# Patient Record
Sex: MALE | Race: WHITE
[De-identification: names, ages, dates, MRNs, and addresses within clinical notes are randomized per-mention and may not be internally consistent; named-entity substitution may affect disease eponyms.]

---

## 2017-01-03 ENCOUNTER — HOSPITAL ENCOUNTER (EMERGENCY)
Dept: HOSPITAL 80 - FED | Age: 82
Discharge: HOME | End: 2017-01-03
Payer: COMMERCIAL

## 2017-01-03 VITALS — RESPIRATION RATE: 16 BRPM | OXYGEN SATURATION: 97 %

## 2017-01-03 VITALS — TEMPERATURE: 98.4 F | SYSTOLIC BLOOD PRESSURE: 126 MMHG | HEART RATE: 68 BPM | DIASTOLIC BLOOD PRESSURE: 74 MMHG

## 2017-01-03 DIAGNOSIS — I10: ICD-10-CM

## 2017-01-03 DIAGNOSIS — E11.65: ICD-10-CM

## 2017-01-03 DIAGNOSIS — S42.402A: Primary | ICD-10-CM
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
## 2017-01-03 DIAGNOSIS — Z79.82: ICD-10-CM

## 2017-01-03 DIAGNOSIS — Z91.040: ICD-10-CM
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
## 2017-01-03 DIAGNOSIS — I50.9: ICD-10-CM

## 2017-01-03 LAB
% IMMATURE GRANULYOCYTES: 0.2 % (ref 0–1.1)
ABSOLUTE IMMATURE GRANULOCYTES: 0.02 10^3/UL (ref 0–0.1)
ABSOLUTE NRBC COUNT: 0 10^3/UL (ref 0–0.01)
ADD DIFF?: NO
ADD MORPH?: NO
ADD SCAN?: NO
ANION GAP SERPL CALC-SCNC: 11 MEQ/L (ref 8–16)
APTT BLD: 29.8 SEC (ref 23–38)
ATYPICAL LYMPHOCYTE FLAG: 0 (ref 0–99)
CALCIUM SERPL-MCNC: 9.3 MG/DL (ref 8.5–10.4)
CHLORIDE SERPL-SCNC: 102 MEQ/L (ref 97–110)
CO2 SERPL-SCNC: 23 MEQ/L (ref 22–31)
CREAT SERPL-MCNC: 1.1 MG/DL (ref 0.7–1.3)
ERYTHROCYTE [DISTWIDTH] IN BLOOD BY AUTOMATED COUNT: 13.6 % (ref 11.5–15.2)
FRAGMENT RBC FLAG: 0 (ref 0–99)
GFR SERPL CREATININE-BSD FRML MDRD: > 60 ML/MIN/{1.73_M2}
GLUCOSE SERPL-MCNC: 203 MG/DL (ref 70–100)
HCT VFR BLD CALC: 41.2 % (ref 40–51)
HGB BLD-MCNC: 14.5 G/DL (ref 13.7–17.5)
INR PPP: 1.06 (ref 0.83–1.16)
LEFT SHIFT FLG: 0 (ref 0–99)
LIPEMIA HEMOLYSIS FLAG: 90 (ref 0–99)
MCH RBC BLDCO QN: 32.8 PG (ref 27.9–34.1)
MCHC RBC AUTO-ENTMCNC: 35.2 G/DL (ref 32.4–36.7)
MCV RBC AUTO: 93.2 FL (ref 81.5–99.8)
NRBC-AUTO%: 0 % (ref 0–0.2)
PLATELET # BLD: 159 10^3/UL (ref 150–400)
PLATELET CLUMPS FLAG: 0 (ref 0–99)
PMV BLD AUTO: 9.5 FL (ref 8.7–11.7)
POTASSIUM SERPL-SCNC: 4 MEQ/L (ref 3.5–5.2)
PROTHROMBIN TIME: 13.7 SEC (ref 12–15)
RBC # BLD AUTO: 4.42 10^6/UL (ref 4.4–6.38)
SODIUM SERPL-SCNC: 136 MEQ/L (ref 134–144)
TROPONIN I SERPL-MCNC: 0.01 NG/ML (ref 0–0.03)
URATE SERPL-MCNC: 7 MG/DL (ref 3.5–8.5)

## 2017-01-03 NOTE — EDPHY
H & P


Stated Complaint: L elbow hurts since yesterday;increases w/movement;states 

slightly swollen


Time Seen by Provider: 01/03/17 07:19


HPI/ROS: 


CHIEF COMPLAINT:  Left elbow pain and swelling


 


HISTORY OF PRESENT ILLNESS:  Patient is an 85-year-old man with history of 

hypertension and type 2 diabetes who comes to the emergency department 

complaining of left elbow pain and swelling for the last 2 days.  He states 

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  He denies 

any trauma or injury. He does have a history of arthritis as well no gout.  He 

denies chest pain or shortness of breath.  It hurts with range of motion. No 

shoulder or wrist pain. No erythema or warmth.  He has not been febrile.








REVIEW OF SYSTEMS:


Constitutional:  denies: chills, fever, recent illness, recent injury


EENTM: denies: blurred vision, double vision, nose congestion


Respiratory: denies: cough, shortness of breath


Cardiac: denies: chest pain, irregular heart rate, lightheadedness, palpitations


Gastrointestinal/Abdominal: denies: abdominal pain, diarrhea, nausea, vomiting, 

blood streaked stools


Genitourinary: denies: dysuria, frequency, hematuria, pain


Musculoskeletal:  See HPI


Skin: denies: lesions, rash, jaundice, bruising


Neurological: denies: headache, numbness, paresthesia, tingling, dizziness, 

weakness


Hematologic/Lymphatic: denies: blood clots, easy bleeding, easy bruising


Immunologic/allergic: denies: HIV/AIDS, transplant








EXAM:


GENERAL:  Well-appearing, well-nourished and in no acute distress.


HEAD:  Atraumatic, normocephalic.


EYES:  Pupils equal round and reactive to light, extraocular movements intact, 

sclera anicteric, conjunctiva are normal.


ENT:  TMs normal, nares patent, oropharynx clear without exudates.  Moist 

mucous membranes.


NECK:  Normal range of motion, supple without lymphadenopathy or JVD.


LUNGS:  Breath sounds clear to auscultation bilaterally and equal.  No wheezes 

rales or rhonchi.


HEART:  Regular rate and rhythm without murmurs, rubs or gallops.


ABDOMEN:  Soft, nontender, normoactive bowel sounds.  No guarding, no rebound.  

No masses appreciated.


BACK:  No CVA tenderness, no spinal tenderness, step-offs or deformities


EXTREMITIES:  Left elbow with mild swelling, pain with range of motion. No 

obvious effusion felt.  No warmth to the touch.  No erythema.


NEUROLOGICAL:  Cranial nerves II through XII grossly intact.  Normal speech, 

normal gait.  5/5 strength, normal movement in all extremities, normal sensation


PSYCH:  Normal mood, normal affect.


SKIN:  Warm, dry, normal turgor, no visible rashes or lesions.








Source: Patient, Family


Exam Limitations: No limitations





- Personal History


Current Tetanus Diphtheria and Acellular Pertussis (TDAP): Yes





- Medical/Surgical History


Hx Asthma: No


Hx Chronic Respiratory Disease: No


Hx Diabetes: Yes


Hx Cardiac Disease: Yes


Hx Renal Disease: No


Hx Cirrhosis: No


Hx Alcoholism: No


Hx HIV/AIDS: No


Hx Splenectomy or Spleen Trauma: No


Other PMH: Pacer, CHF, A-fib, HTN, hyperlipidemia, hypothyroidism;diabetes;

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

DIVERTICULOSIS





- Family History


Significant Family History: Hypertension





- Social History


Smoking Status: Never smoked


Alcohol Use: Sober


Drug Use: None


Constitutional: 


 Initial Vital Signs











Temperature (C)  37 C   01/03/17 07:10


 


Heart Rate  67   01/03/17 07:10


 


Respiratory Rate  16   01/03/17 07:10


 


Blood Pressure  130/75 H  01/03/17 07:10


 


O2 Sat (%)  97   01/03/17 07:10








 











O2 Delivery Mode               Room Air














Allergies/Adverse Reactions: 


 





latex Allergy (Mild, Verified 01/03/17 07:10)


 itch








Home Medications: 














 Medication  Instructions  Recorded


 


Aspirin [Aspirin 81mg (*)] 81 mg PO DAILY 01/03/17


 


Atorvastatin Calcium [Lipitor 10 10 mg PO DAILY 01/03/17





mg (*)]  


 


Carvedilol [Coreg (*)] 12.5 mg PO BIDMEAL 01/03/17


 


Furosemide [Lasix] 40 mg PO 01/03/17


 


Herbals/Supplements -Info Only 1 ea PO 01/03/17


 


Hydrocodone/APAP 5/325 [[place] 1 - 2 each PO Q4 PRN #14 tab 01/03/17





5/325]  


 


Levothyroxine [Synthroid 75 mcg 75 mcg PO DAILY06 01/03/17





(*)]  


 


Lisinopril [Zestril 10 mg (*)] 10 mg PO 01/03/17


 


metFORMIN HCL [Glucophage 500 mg 500 mg PO 01/03/17





(*)]  














Medical Decision Making





- Diagnostics


EKG Interpretation: 


An EKG obtained and was read and documented in trace view.  Please see trace 

view for full reading and report.  Ventricular paced rhythm, no ischemic 

changes 


Imaging: 


X-ray:  Left arm x-ray was obtained.  I viewed the images myself on the PACS 

system.  My interpretation of the images is:  Joint effusion.  The radiologist 

interpretation is joint effusion and likely radial head fracture.


Procedures: 


Procedure:  Splint placement.





A left elbow short arm splint was applied.  After application of the splint I 

returned and re-examined the patient.  The splint was adequately immobilizing 

the joint and distal to the splint the patient's circulation and sensation was 

intact.


ED Course/Re-evaluation: 


Patient has what appears to be a radial head fracture on x-ray.  He denies any 

recent falls or injuries but his wife states that he typically will not answer 

questions about hurting himself and will not give me any information.  Wife 

states that he did break it in the same area last year and that he has been 

lifting junior of hay recently.  His elbow does not appear infected.  His lab 

work is reassuring as is his EKG.  I will place him in a splint and have him 

follow up with Orthopedics.  I discussed with him and his wife indications for 

returning not limited to but especially pertaining to fevers or signs of 

infections. Additional verbal discharge instructions given.  We also discussed 

his elevated glucose and need for tight control.


Differential Diagnosis: 


Partial list of the Differential diagnosis considered include but were not 

limited to;  fracture, joint effusion, gout, arthritis, and although unlikely 

based on the history and physical exam, I also considered dislocation, vascular 

injury, doubt septic joint.  I discussed these differential diagnoses and the 

plan with the patient as well as the usual and expected course.  The patient 

understands that the diagnosis is provisional and that in medicine we are not 

always correct and that further workup is often warranted.  Usual and customary 

warnings were given.  All of the patient's questions were answered.  The 

patient was instructed to return to the emergency department should the 

symptoms at all worsen or return, otherwise to followup with the physician as 

we discussed.





- Data Points


Laboratory Results: 


 Laboratory Results





 01/03/17 07:30 





 01/03/17 07:30 











Departure





- Departure


Disposition: Home, Routine, Self-Care


Clinical Impression: 


Elbow fracture, left


Qualifiers:


 Encounter type: initial encounter Fracture type: closed Qualifier Code: (

S42.402A) Unspecified fracture of lower end of left humerus, initial encounter 

for closed fracture





Hyperglycemia due to type 2 diabetes mellitus


Qualifiers:


 Diabetes mellitus long term insulin use: without long term use Qualifier Code: 

(E11.65) Type 2 diabetes mellitus with hyperglycemia


Condition: Fair


Instructions:  Elbow Fracture in Adults (ED), Diabetic Hyperglycemia (ED)


Referrals: 


Marv Mckinney MD [Primary Care Provider] - As per Instructions


Alexsander Alaniz MD [Medical Doctor] - As per Instructions


Prescriptions: 


Hydrocodone/APAP 5/325 [[place] 5/325] 1 - 2 each PO Q4 PRN #14 tab


 PRN Reason: Pain, Mild

## 2017-01-03 NOTE — DX
Left Elbow, Three Views 

 

Clinical Indication: Elbow pain.

 

Findings: There is a moderate-sized joint effusion. There is a moderate amount of debris in the joint
 space versus calcifications at tendinous insertions. Subtle linear lucency is seen in the radial nec
k on the lateral view.  

 

Impression:   Moderate-sized joint effusion. Suspect occult fracture. Linear lucency of the radial ne
ck may represent a nondisplaced fracture. Moderate amount of degenerative debris is also present and 
calcification of ligamentous attachments.

 

Results discussed by Dr. Sanchez to Dr. Trujillo on January 3, 2017 at 0840 hours.

 

I have also reviewed this with Dr. Guy Mares who agrees with the above findings.

## 2017-01-03 NOTE — CPEKG
Heart Rate: 72

RR Interval: 833

P-R Interval: 231

QRSD Interval: 184

QT Interval: 440

QTC Interval: 482

P Axis: 0

QRS Axis: -69

T Wave Axis: 101

EKG Severity - ABNORMAL ECG -

EKG Impression: VENTRICULAR-PACED RHYTHM

Electronically Signed By: Spike Trujillo 03-Jan-2017 07:47:56

## 2017-08-31 ENCOUNTER — HOSPITAL ENCOUNTER (OUTPATIENT)
Dept: HOSPITAL 80 - BHFA | Age: 82
End: 2017-08-31
Attending: INTERNAL MEDICINE
Payer: COMMERCIAL

## 2017-08-31 DIAGNOSIS — I48.2: Primary | ICD-10-CM

## 2017-08-31 DIAGNOSIS — Z95.0: ICD-10-CM

## 2017-09-19 ENCOUNTER — HOSPITAL ENCOUNTER (OUTPATIENT)
Dept: HOSPITAL 80 - FCATH | Age: 82
Discharge: HOME | End: 2017-09-19
Attending: INTERNAL MEDICINE
Payer: COMMERCIAL

## 2017-09-19 DIAGNOSIS — Z45.010: Primary | ICD-10-CM

## 2017-09-19 DIAGNOSIS — I48.91: ICD-10-CM

## 2017-09-19 LAB
% IMMATURE GRANULYOCYTES: 0.4 % (ref 0–1.1)
ABSOLUTE IMMATURE GRANULOCYTES: 0.03 10^3/UL (ref 0–0.1)
ABSOLUTE NRBC COUNT: 0 10^3/UL (ref 0–0.01)
ADD DIFF?: NO
ADD MORPH?: NO
ADD SCAN?: NO
ANION GAP SERPL CALC-SCNC: 10 MEQ/L (ref 8–16)
ATYPICAL LYMPHOCYTE FLAG: 10 (ref 0–99)
CALCIUM SERPL-MCNC: 9.3 MG/DL (ref 8.5–10.4)
CHLORIDE SERPL-SCNC: 104 MEQ/L (ref 97–110)
CO2 SERPL-SCNC: 25 MEQ/L (ref 22–31)
CREAT SERPL-MCNC: 1.1 MG/DL (ref 0.7–1.3)
ERYTHROCYTE [DISTWIDTH] IN BLOOD BY AUTOMATED COUNT: 13.8 % (ref 11.5–15.2)
FRAGMENT RBC FLAG: 0 (ref 0–99)
GFR SERPL CREATININE-BSD FRML MDRD: > 60 ML/MIN/{1.73_M2}
GLUCOSE SERPL-MCNC: 95 MG/DL (ref 70–100)
HCT VFR BLD CALC: 42.5 % (ref 40–51)
HGB BLD-MCNC: 14.4 G/DL (ref 13.7–17.5)
INR PPP: 1.12 (ref 0.83–1.16)
LEFT SHIFT FLG: 0 (ref 0–99)
LIPEMIA HEMOLYSIS FLAG: 90 (ref 0–99)
MCH RBC BLDCO QN: 32.5 PG (ref 27.9–34.1)
MCHC RBC AUTO-ENTMCNC: 33.9 G/DL (ref 32.4–36.7)
MCV RBC AUTO: 95.9 FL (ref 81.5–99.8)
NRBC-AUTO%: 0 % (ref 0–0.2)
PLATELET # BLD: 207 10^3/UL (ref 150–400)
PLATELET CLUMPS FLAG: 0 (ref 0–99)
PMV BLD AUTO: 9.1 FL (ref 8.7–11.7)
POTASSIUM SERPL-SCNC: 4.4 MEQ/L (ref 3.5–5.2)
PROTHROMBIN TIME: 14.3 SEC (ref 12–15)
RBC # BLD AUTO: 4.43 10^6/UL (ref 4.4–6.38)
SODIUM SERPL-SCNC: 139 MEQ/L (ref 134–144)

## 2017-09-19 PROCEDURE — 0JH635Z INSERTION OF PACEMAKER, SINGLE CHAMBER RATE RESPONSIVE INTO CHEST SUBCUTANEOUS TISSUE AND FASCIA, PERCUTANEOUS APPROACH: ICD-10-PCS | Performed by: INTERNAL MEDICINE

## 2017-09-19 PROCEDURE — C1786 PMKR, SINGLE, RATE-RESP: HCPCS

## 2017-09-19 PROCEDURE — 0JPT3PZ REMOVAL OF CARDIAC RHYTHM RELATED DEVICE FROM TRUNK SUBCUTANEOUS TISSUE AND FASCIA, PERCUTANEOUS APPROACH: ICD-10-PCS | Performed by: INTERNAL MEDICINE

## 2017-09-19 NOTE — CPEKG
Heart Rate: 71

RR Interval: 845

QRSD Interval: 124

QT Interval: 420

QTC Interval: 457

P Axis: 0

QRS Axis: -62

T Wave Axis: 154

EKG Severity - ABNORMAL ECG -

EKG Impression: VENTRICULAR-PACED COMPLEXES

EKG Impression: PVC

Electronically Signed By: Mahesh Singh 19-Sep-2017 11:41:50

## 2017-09-19 NOTE — PDPROPOC
Sedation Plan of Care


Sedation Plan of Care: vital signs stable


ASA Classification: ASA 2


Planned drugs: fentanyl, midazolam


Mallampati Score: Class 2


Mallampati Reference Image: 





Patient passed 3-3-2 rule?: Yes

## 2017-09-19 NOTE — EPPROC
Electrophysiology Procedure Note: 





PROCEDURE PERFORMED:





1.   V Pacemaker generator change





INDICATION:





Pacemaker generator at [place]


Bradycardia


Atrial fibrillation





PROCEDURE NOTE:





Patient presented to the cardiac catheterization laboratory in a fasting, 

postabsorptive state.  CCL RN administered sedation.  The left infraclavicular 

area was prepped and draped in the usual sterile fashion.  Lidocaine plus 

bupivacaine was used for local anesthesia. Using a combination of blunt and 

sharp dissection and electrocautery, the dissection was carried down to the 

prepectoral fascia and the existing pacemaker pocket was opened.  





The pacemaker generator was disconnected from the leads and the lead thresholds 

and impedance were checked.   The pacemaker pocket was copiously irrigated with 

antibiotic solution.  The pocket was again inspected for any bleeding.  The 

leads were attached to the pacemaker securely.  The pacemaker was inserted into 

the pocket and secured in place with a nonabsorbable suture.  





The pacemaker pocket was closed in 3 layers with absorbable monocryl sutures 

and staples. Appropriate dressing was applied.  The patient left the cardiac 

catheterization laboratory in stable condition.








Serial Numbers:


1.   Device      Biotronik Eluna [id number] SN [id number]


2.   Ventricular Lead     Biotronik SX60/[id number] SN [id number]








Stimulation Thresholds & Impedance Measurements:


 


1.   Ventricular Lead    0.6 V 0.4 ms R 9.6 mV 682 ohm


2.   Abandoned atrial lead, capped previously [id number] SN [id number]





Xiang Pacing Parameters





1.   Pacing mode     VVI   CLS      


2.   Lower rate     60 ppm         


3.   Upper tracking rate     130 ppm      


4.   Upper sensor rate     130 ppm   


Patient Problems: 


 Problems











Problem Status Onset


 


Bradycardia Acute  


 


Gastrointestinal bleeding, lower Acute  


 


Blood loss anemia Acute  


 


Tubular adenoma Acute  


 


Atrial fibrillation Acute

## 2018-05-04 ENCOUNTER — HOSPITAL ENCOUNTER (EMERGENCY)
Dept: HOSPITAL 80 - FED | Age: 83
Discharge: HOME | End: 2018-05-04
Payer: COMMERCIAL

## 2018-05-04 VITALS — SYSTOLIC BLOOD PRESSURE: 162 MMHG | DIASTOLIC BLOOD PRESSURE: 90 MMHG

## 2018-05-04 DIAGNOSIS — Z91.040: ICD-10-CM

## 2018-05-04 DIAGNOSIS — E11.9: ICD-10-CM

## 2018-05-04 DIAGNOSIS — S83.005A: Primary | ICD-10-CM

## 2018-05-04 DIAGNOSIS — Z79.84: ICD-10-CM
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
## 2018-05-04 DIAGNOSIS — Z79.82: ICD-10-CM

## 2018-05-04 LAB — PLATELET # BLD: 195 10^3/UL (ref 150–400)

## 2018-05-04 PROCEDURE — 70450 CT HEAD/BRAIN W/O DYE: CPT

## 2018-05-04 PROCEDURE — L1830 KO IMMOB CANVAS LONG PRE OTS: HCPCS

## 2018-05-04 PROCEDURE — 99285 EMERGENCY DEPT VISIT HI MDM: CPT

## 2018-05-04 PROCEDURE — 71046 X-RAY EXAM CHEST 2 VIEWS: CPT

## 2018-05-04 PROCEDURE — 72125 CT NECK SPINE W/O DYE: CPT

## 2018-05-04 PROCEDURE — 73562 X-RAY EXAM OF KNEE 3: CPT

## 2018-05-04 NOTE — EDPHY
HPI/HX/ROS/PE/MDM


Narrative: 





CHIEF COMPLAINT:  Fall





HPI: The patient is an 86-year-old male with a history multiple prior knee 

surgeries, pacemaker month other medical issues who was brought to the 

emergency department by ambulance after a mechanical fall just prior to 

arrival.  Per the patient and his wife, the patient was feeding cows in 

extremely slight mild which caused him to fall over.  The patient's wife does 

not feel able to extricate him from the month so she called 911.  The patient 

was then brought to the emergency department.  On my evaluation, the patient 

denies any complaints.  His wife, however cyst that the patient will always 

denied any complaints despite the fact that he is in pain.  She has witnessed 

multiple falls over the last several weeks as patient continues to want to take 

care of the farm.  She states that his left knee previously had an issue with a 

dislocated patella which was fixed but continues to give him problems.





REVIEW OF SYSTEMS:


Aside from elements discussed in the HPI, a comprehensive 10-point review of 

systems was reviewed and is negative.





PMH:  Includes history of GI bleed, frequent falls, history of diabetes.  

Multiple knee surgeries.





SOCIAL HISTORY:  , lives with wife.





PHYSICAL EXAM:


General:Patient is alert, in no acute distress.


Head:  Posterior head is atraumatic.


ENT:Eyes are normal to inspection.  ENT inspection normal.  A large abrasion/

discoloration is present on the left side of the patient's face which wife 

states is new.


Neck: Normal inspection.  Full range of motion.


Respiratory:No respiratory distress.  Breath sounds normal bilaterally.  What 

appears to be an old ecchymosis is present on the left mid chest wall below the 

nipple.  No crepitus or tenderness.


Cardiovascular: Regular rate and rhythm.  Strong peripheral pulses.  Normal cap 

refill.


Abdomen:The abdomen is nontender to palpation. There are no peritoneal signs. 

There are normal bowel sounds.


Back: Normal to inspection.  No tenderness to palpation.


Skin: Normal color.  No rash.  Warm and dry.


Extremities:  Left knee appears chronically deformed with a grossly dislocated 

patella in the medial direction.  Arms are completely covered in mud.  No 

tenderness.


Neuro: Oriented x3.  Normal motor function.  Normal sensory function.


ED Course: 





I consulted Dr. Irene from Orthopedics regarding the patient's patella 

dislocation.  He recommends placing the patient in immobilizer and having 

follow up with Dr. Barnett as an outpatient.








MDM: 





This patient presents with what he describes clearly has a mechanical fall with 

essentially an exacerbation of his chronic knee laxity.  The patient's exam was 

somewhat difficult as he is literally covered in mud and denies any complaints, 

but I see no signs of acute traumatic injury on our studies.  He does have some 

discoloration to his face which may represent abrasion versus retained dirt.  I 

had extensive discussion with his wife, who is his primary caregiver, and she 

feels comfortable taking the patient home.  We discussed strict return 

precautions.  I see no signs of skull fracture, intracranial bleed, stroke, 

cervical spine fracture, acute chest trauma or infectious process.





- Data Points


Imaging Results: 


 Imaging Impressions





Knee X-Ray  05/04/18 13:27


Impression: Lateral patellar dislocation. Indeterminate for upper pole patellar 

fracture.








Cervical Spine CT  05/04/18 14:11


Impression:


1. No acute fracture or traumatic subluxation.


2. Multilevel cervical spine degenerative arthrosis as detailed above.


 


Findings were communicated by telephone with Dr. Nikolas Burris MD at  5/4/ 2018 14:43








Head CT  05/04/18 14:11


Impression:


1. No evidence of acute intracranial injury.


2. Moderate cerebral parenchymal atrophy.


3. Moderate-severe white matter disease, likely microvascular ischemia.


 


Findings were communicated by telephone with Dr. Nikolas Burris MD at  5/4/ 2018 14:39











Laboratory Results: 


 Laboratory Results





 05/04/18 14:35 





 05/04/18 14:35 





 











  05/04/18 05/04/18





  14:35 14:35


 


WBC    8.89 10^3/uL 10^3/uL





    (3.80-9.50) 


 


RBC    4.76 10^6/uL 10^6/uL





    (4.40-6.38) 


 


Hgb    14.9 g/dL g/dL





    (13.7-17.5) 


 


Hct    45.0 % %





    (40.0-51.0) 


 


MCV    94.5 fL fL





    (81.5-99.8) 


 


MCH    31.3 pg pg





    (27.9-34.1) 


 


MCHC    33.1 g/dL g/dL





    (32.4-36.7) 


 


RDW    14.4 % %





    (11.5-15.2) 


 


Plt Count    195 10^3/uL 10^3/uL





    (150-400) 


 


MPV    9.0 fL fL





    (8.7-11.7) 


 


Neut % (Auto)    71.6 % %





    (39.3-74.2) 


 


Lymph % (Auto)    16.4 % %





    (15.0-45.0) 


 


Mono % (Auto)    8.1 % %





    (4.5-13.0) 


 


Eos % (Auto)    3.0 % %





    (0.6-7.6) 


 


Baso % (Auto)    0.6 % %





    (0.3-1.7) 


 


Nucleat RBC Rel Count    0.0 % %





    (0.0-0.2) 


 


Absolute Neuts (auto)    6.36 10^3/uL 10^3/uL





    (1.70-6.50) 


 


Absolute Lymphs (auto)    1.46 10^3/uL 10^3/uL





    (1.00-3.00) 


 


Absolute Monos (auto)    0.72 10^3/uL 10^3/uL





    (0.30-0.80) 


 


Absolute Eos (auto)    0.27 10^3/uL 10^3/uL





    (0.03-0.40) 


 


Absolute Basos (auto)    0.05 10^3/uL 10^3/uL





    (0.02-0.10) 


 


Absolute Nucleated RBC    0.00 10^3/uL 10^3/uL





    (0-0.01) 


 


Immature Gran %    0.3 % %





    (0.0-1.1) 


 


Immature Gran #    0.03 10^3/uL 10^3/uL





    (0.00-0.10) 


 


Sodium  140 mEq/L mEq/L  





   (135-145)  


 


Potassium  4.1 mEq/L mEq/L  





   (3.5-5.2)  


 


Chloride  104 mEq/L mEq/L  





   ()  


 


Carbon Dioxide  27 mEq/l mEq/l  





   (22-31)  


 


Anion Gap  9 mEq/L mEq/L  





   (8-16)  


 


BUN  21 mg/dL mg/dL  





   (7-23)  


 


Creatinine  0.8 mg/dL mg/dL  





   (0.7-1.3)  


 


Estimated GFR  > 60   





   


 


Glucose  90 mg/dL mg/dL  





   ()  


 


Calcium  8.9 mg/dL mg/dL  





   (8.5-10.4)  


 


Troponin I  0.030 ng/mL ng/mL  





   (0.000-0.034)  














General


Time Seen by Provider: 05/04/18 13:16


Initial Vital Signs: 


 Initial Vital Signs











Heart Rate  70   05/04/18 13:17


 


Respiratory Rate  18   05/04/18 13:17


 


Blood Pressure  162/113 H  05/04/18 13:17








 











O2 Delivery Mode               Room Air














Allergies/Adverse Reactions: 


 





latex Allergy (Mild, Verified 01/03/17 07:10)


 itch








Home Medications: 














 Medication  Instructions  Recorded


 


Aspirin [Aspirin 81mg (*)] 81 mg PO DAILY 01/03/17


 


Atorvastatin Calcium [Lipitor 10 10 mg PO DAILY 01/03/17





mg (*)]  


 


Carvedilol [Coreg (*)] 12.5 mg PO BIDMEAL 01/03/17


 


Herbals/Supplements -Info Only 1 ea PO 01/03/17


 


Levothyroxine [Synthroid 75 mcg 75 mcg PO DAILY06 01/03/17





(*)]  


 


Lisinopril [Zestril 10 mg (*)] 10 mg PO 01/03/17


 


metFORMIN HCL [Glucophage 500 mg 500 mg PO 01/03/17





(*)]  














Departure





- Departure


Disposition: Home, Routine, Self-Care


Clinical Impression: 


 Dislocation, patella closed





Condition: Good


Instructions:  Patellar Dislocation (ED)


Additional Instructions: 


We recommend he follow up with year orthopedic surgeon within the next 4-5 

days.  Wear splint until that time.  Please be very careful as you are already 

at risk for falling.  Return to the emergency department for headache, neck pain

, chest pain, abdominal pain, shortness of breath or other concerns.


Referrals: 


NONE *PRIMARY CARE P,. [Primary Care Provider] - As per Instructions